# Patient Record
Sex: MALE | Race: WHITE
[De-identification: names, ages, dates, MRNs, and addresses within clinical notes are randomized per-mention and may not be internally consistent; named-entity substitution may affect disease eponyms.]

---

## 2018-05-25 ENCOUNTER — HOSPITAL ENCOUNTER (INPATIENT)
Dept: HOSPITAL 47 - EC | Age: 68
LOS: 3 days | Discharge: HOME | DRG: 247 | End: 2018-05-28
Payer: MEDICARE

## 2018-05-25 VITALS — BODY MASS INDEX: 37 KG/M2

## 2018-05-25 DIAGNOSIS — Z87.891: ICD-10-CM

## 2018-05-25 DIAGNOSIS — Z79.899: ICD-10-CM

## 2018-05-25 DIAGNOSIS — I47.2: ICD-10-CM

## 2018-05-25 DIAGNOSIS — I21.4: Primary | ICD-10-CM

## 2018-05-25 DIAGNOSIS — Z87.81: ICD-10-CM

## 2018-05-25 DIAGNOSIS — M19.91: ICD-10-CM

## 2018-05-25 DIAGNOSIS — Z94.7: ICD-10-CM

## 2018-05-25 DIAGNOSIS — Z98.1: ICD-10-CM

## 2018-05-25 DIAGNOSIS — Z87.442: ICD-10-CM

## 2018-05-25 DIAGNOSIS — E66.9: ICD-10-CM

## 2018-05-25 DIAGNOSIS — I10: ICD-10-CM

## 2018-05-25 DIAGNOSIS — E11.65: ICD-10-CM

## 2018-05-25 DIAGNOSIS — Z87.828: ICD-10-CM

## 2018-05-25 DIAGNOSIS — Z86.73: ICD-10-CM

## 2018-05-25 LAB
ALBUMIN SERPL-MCNC: 4 G/DL (ref 3.5–5)
ALP SERPL-CCNC: 123 U/L (ref 38–126)
ALT SERPL-CCNC: 47 U/L (ref 21–72)
ANION GAP SERPL CALC-SCNC: 15 MMOL/L
APTT BLD: 22.9 SEC (ref 22–30)
AST SERPL-CCNC: 28 U/L (ref 17–59)
BASOPHILS # BLD AUTO: 0.1 K/UL (ref 0–0.2)
BASOPHILS NFR BLD AUTO: 1 %
BUN SERPL-SCNC: 13 MG/DL (ref 9–20)
CALCIUM SPEC-MCNC: 9.6 MG/DL (ref 8.4–10.2)
CHLORIDE SERPL-SCNC: 103 MMOL/L (ref 98–107)
CK SERPL-CCNC: 161 U/L (ref 55–170)
CK SERPL-CCNC: 219 U/L (ref 55–170)
CO2 SERPL-SCNC: 22 MMOL/L (ref 22–30)
D DIMER PPP FEU-MCNC: 0.31 MG/L FEU (ref ?–0.6)
EOSINOPHIL # BLD AUTO: 0 K/UL (ref 0–0.7)
EOSINOPHIL NFR BLD AUTO: 0 %
ERYTHROCYTE [DISTWIDTH] IN BLOOD BY AUTOMATED COUNT: 5.49 M/UL (ref 4.3–5.9)
ERYTHROCYTE [DISTWIDTH] IN BLOOD: 13.9 % (ref 11.5–15.5)
GLUCOSE BLD-MCNC: 253 MG/DL (ref 75–99)
GLUCOSE SERPL-MCNC: 274 MG/DL (ref 74–99)
HCT VFR BLD AUTO: 50 % (ref 39–53)
HGB BLD-MCNC: 17 GM/DL (ref 13–17.5)
INR PPP: 1.1 (ref ?–1.2)
LYMPHOCYTES # SPEC AUTO: 1.1 K/UL (ref 1–4.8)
LYMPHOCYTES NFR SPEC AUTO: 13 %
MAGNESIUM SPEC-SCNC: 1.9 MG/DL (ref 1.6–2.3)
MCH RBC QN AUTO: 31 PG (ref 25–35)
MCHC RBC AUTO-ENTMCNC: 34 G/DL (ref 31–37)
MCV RBC AUTO: 91.1 FL (ref 80–100)
MONOCYTES # BLD AUTO: 0.4 K/UL (ref 0–1)
MONOCYTES NFR BLD AUTO: 5 %
NEUTROPHILS # BLD AUTO: 7.3 K/UL (ref 1.3–7.7)
NEUTROPHILS NFR BLD AUTO: 81 %
PLATELET # BLD AUTO: 162 K/UL (ref 150–450)
POTASSIUM SERPL-SCNC: 5.1 MMOL/L (ref 3.5–5.1)
PROT SERPL-MCNC: 6.7 G/DL (ref 6.3–8.2)
PT BLD: 10.7 SEC (ref 9–12)
SODIUM SERPL-SCNC: 140 MMOL/L (ref 137–145)
TROPONIN I SERPL-MCNC: 0.08 NG/ML (ref 0–0.03)
TROPONIN I SERPL-MCNC: 0.28 NG/ML (ref 0–0.03)
WBC # BLD AUTO: 9.1 K/UL (ref 3.8–10.6)

## 2018-05-25 PROCEDURE — 80053 COMPREHEN METABOLIC PANEL: CPT

## 2018-05-25 PROCEDURE — 85379 FIBRIN DEGRADATION QUANT: CPT

## 2018-05-25 PROCEDURE — 93306 TTE W/DOPPLER COMPLETE: CPT

## 2018-05-25 PROCEDURE — 84132 ASSAY OF SERUM POTASSIUM: CPT

## 2018-05-25 PROCEDURE — 85730 THROMBOPLASTIN TIME PARTIAL: CPT

## 2018-05-25 PROCEDURE — 85025 COMPLETE CBC W/AUTO DIFF WBC: CPT

## 2018-05-25 PROCEDURE — 96374 THER/PROPH/DIAG INJ IV PUSH: CPT

## 2018-05-25 PROCEDURE — 36415 COLL VENOUS BLD VENIPUNCTURE: CPT

## 2018-05-25 PROCEDURE — 71046 X-RAY EXAM CHEST 2 VIEWS: CPT

## 2018-05-25 PROCEDURE — 85610 PROTHROMBIN TIME: CPT

## 2018-05-25 PROCEDURE — 99291 CRITICAL CARE FIRST HOUR: CPT

## 2018-05-25 PROCEDURE — 82553 CREATINE MB FRACTION: CPT

## 2018-05-25 PROCEDURE — 83036 HEMOGLOBIN GLYCOSYLATED A1C: CPT

## 2018-05-25 PROCEDURE — 80048 BASIC METABOLIC PNL TOTAL CA: CPT

## 2018-05-25 PROCEDURE — 82550 ASSAY OF CK (CPK): CPT

## 2018-05-25 PROCEDURE — 93005 ELECTROCARDIOGRAM TRACING: CPT

## 2018-05-25 PROCEDURE — 84484 ASSAY OF TROPONIN QUANT: CPT

## 2018-05-25 PROCEDURE — 83735 ASSAY OF MAGNESIUM: CPT

## 2018-05-25 PROCEDURE — 80061 LIPID PANEL: CPT

## 2018-05-25 PROCEDURE — 93458 L HRT ARTERY/VENTRICLE ANGIO: CPT

## 2018-05-25 RX ADMIN — SODIUM CHLORIDE, PRESERVATIVE FREE SCH ML: 5 INJECTION INTRAVENOUS at 20:54

## 2018-05-25 RX ADMIN — METOPROLOL TARTRATE SCH MG: 25 TABLET, FILM COATED ORAL at 18:05

## 2018-05-25 RX ADMIN — NITROGLYCERIN SCH: 20 OINTMENT TOPICAL at 23:50

## 2018-05-25 RX ADMIN — INSULIN ASPART SCH UNIT: 100 INJECTION, SOLUTION INTRAVENOUS; SUBCUTANEOUS at 22:51

## 2018-05-25 RX ADMIN — NITROGLYCERIN SCH INCH: 20 OINTMENT TOPICAL at 18:05

## 2018-05-25 RX ADMIN — SODIUM CHLORIDE SCH MLS/HR: 450 INJECTION, SOLUTION INTRAVENOUS at 15:56

## 2018-05-25 RX ADMIN — ACETAMINOPHEN AND CODEINE PHOSPHATE PRN EACH: 300; 30 TABLET ORAL at 20:52

## 2018-05-25 RX ADMIN — METOPROLOL TARTRATE SCH: 25 TABLET, FILM COATED ORAL at 22:52

## 2018-05-25 NOTE — XR
EXAMINATION TYPE: XR chest 2V

 

DATE OF EXAM: 5/25/2018

 

COMPARISON: Prior chest CT 6/12/2012

 

HISTORY: Chest pain

 

TECHNIQUE:  Frontal and lateral views of the chest are obtained.

 

FINDINGS:  There is no focal air space opacity, pleural effusion, or pneumothorax seen.  The cardiac 
silhouette size is within normal limits.   The osseous structures are intact. Postop changes are note
d to the cervical spine. Patient is post median sternotomy. There are prominent lung volumes possibly
 indicative of COPD.

 

IMPRESSION:  No acute cardiopulmonary process.

## 2018-05-25 NOTE — ED
General Adult HPI





- General


Chief complaint: Chest Pain


Stated complaint: Chest Pain


Time Seen by Provider: 05/25/18 14:06


Source: patient, EMS, RN notes reviewed


Mode of arrival: EMS


Limitations: no limitations





- History of Present Illness


Initial comments: 





Patient is a pleasant 67-year-old male presenting to the emergency Department 

with complaints of chest discomfort.  Onset was as he was finishing mowing the 

lawn.  Discomfort is somewhat improved with nitroglycerin.  Discomfort is 

currently 4/10.  No associated dyspnea or nausea.  Patient was sweaty however 

feels he may been sweaty just from cutting the grass.  No history of similar 

symptoms previously.  There was some radiation towards the left shoulder and 

right jaw.  Patient has a difficult time describing the type of discomfort he 

experienced.





- Related Data


 Home Medications











 Medication  Instructions  Recorded  Confirmed


 


Cinnamon Bark [Cinnamon] 500 mg PO DAILY 05/25/18 05/25/18


 


Fenugreek 1 tab PO DAILY 05/25/18 05/25/18


 


Garlic 1 tab PO DAILY 05/25/18 05/25/18


 


Glucosam/Gamal-Msm1/C/Carson/Bosw 1 tab PO DAILY 05/25/18 05/25/18





[Glucosamine-Chondroitin Tablet]   


 


Magnesium(Unknown Dose) 1 tab PO DAILY 05/25/18 05/25/18


 


Prostate Strong 1 tab PO DAILY 05/25/18 05/25/18











 Allergies











Allergy/AdvReac Type Severity Reaction Status Date / Time


 


No Known Allergies Allergy   Verified 05/25/18 14:24














Review of Systems


ROS Statement: 


Those systems with pertinent positive or pertinent negative responses have been 

documented in the HPI.





ROS Other: All systems not noted in ROS Statement are negative.


Constitutional: Denies: fever


Eyes: Denies: eye pain


ENT: Denies: ear pain


Respiratory: Denies: cough, dyspnea


Cardiovascular: Reports: chest pain


Endocrine: Denies: fatigue


Gastrointestinal: Denies: nausea


Genitourinary: Denies: dysuria


Musculoskeletal: Denies: back pain


Skin: Denies: rash


Neurological: Denies: weakness





Past Medical History


Past Medical History: No Reported History


History of Any Multi-Drug Resistant Organisms: None Reported


Past Surgical History: Orthopedic Surgery


Past Psychological History: No Psychological Hx Reported


Smoking Status: Former smoker


Past Alcohol Use History: Occasional


Past Drug Use History: None Reported





General Exam


Limitations: no limitations


General appearance: alert, in no apparent distress


Head exam: Present: atraumatic


Eye exam: Present: normal appearance, PERRL


ENT exam: Present: normal oropharynx


Neck exam: Present: normal inspection


Respiratory exam: Present: normal lung sounds bilaterally.  Absent: chest wall 

tenderness


Cardiovascular Exam: Present: regular rate, normal rhythm, normal heart sounds


  ** Expanded


Peripheral pulses: 2+: Radial (R), Radial (L), Posterior Tibialis (R), 

Posterior Tibialis (L)


GI/Abdominal exam: Present: soft.  Absent: tenderness


Extremities exam: Present: normal inspection.  Absent: pedal edema, calf 

tenderness


Neurological exam: Present: alert


Psychiatric exam: Present: normal affect, normal mood


Skin exam: Present: normal color





Course


 Vital Signs











  05/25/18 05/25/18





  13:49 15:25


 


Temperature 97.8 F 


 


Pulse Rate 84 76


 


Respiratory 18 16





Rate  


 


Blood Pressure 169/97 169/89


 


O2 Sat by Pulse 96 99





Oximetry  














EKG Findings





- EKG Comments:


EKG Findings:: Normal sinus rhythm 79.  .  .  .  .  

Normal axis.  Normal QRS.  No acute ST change.





Medical Decision Making





- Medical Decision Making





Patient reevaluated and resting comfortably in bed.  No significant discomfort 

at this time.  Patient and family are updated on results and plan.  Concern 

exists with elevated troponin.  Patient will be started on heparin.  Case was 

discussed with Dr. deluna, who will admit for Dr. Castrejon.  Cardiology will be 

placed on consult.  Patient is advised to inform nursing if his symptoms worsen.





- Lab Data


Result diagrams: 


 05/25/18 13:54





 05/25/18 13:54


 Lab Results











  05/25/18 05/25/18 05/25/18 Range/Units





  13:54 13:54 13:54 


 


WBC   9.1   (3.8-10.6)  k/uL


 


RBC   5.49   (4.30-5.90)  m/uL


 


Hgb   17.0   (13.0-17.5)  gm/dL


 


Hct   50.0   (39.0-53.0)  %


 


MCV   91.1   (80.0-100.0)  fL


 


MCH   31.0   (25.0-35.0)  pg


 


MCHC   34.0   (31.0-37.0)  g/dL


 


RDW   13.9   (11.5-15.5)  %


 


Plt Count   162   (150-450)  k/uL


 


Neutrophils %   81   %


 


Lymphocytes %   13   %


 


Monocytes %   5   %


 


Eosinophils %   0   %


 


Basophils %   1   %


 


Neutrophils #   7.3   (1.3-7.7)  k/uL


 


Lymphocytes #   1.1   (1.0-4.8)  k/uL


 


Monocytes #   0.4   (0-1.0)  k/uL


 


Eosinophils #   0.0   (0-0.7)  k/uL


 


Basophils #   0.1   (0-0.2)  k/uL


 


PT     (9.0-12.0)  sec


 


INR     (<1.2)  


 


APTT     (22.0-30.0)  sec


 


D-Dimer     (<0.60)  mg/L FEU


 


Sodium    140  (137-145)  mmol/L


 


Potassium    5.1  (3.5-5.1)  mmol/L


 


Chloride    103  ()  mmol/L


 


Carbon Dioxide    22  (22-30)  mmol/L


 


Anion Gap    15  mmol/L


 


BUN    13  (9-20)  mg/dL


 


Creatinine    0.84  (0.66-1.25)  mg/dL


 


Est GFR (CKD-EPI)AfAm    >90  (>60 ml/min/1.73 sqM)  


 


Est GFR (CKD-EPI)NonAf    >90  (>60 ml/min/1.73 sqM)  


 


Glucose    274 H  (74-99)  mg/dL


 


Calcium    9.6  (8.4-10.2)  mg/dL


 


Magnesium    1.9  (1.6-2.3)  mg/dL


 


Total Bilirubin    2.1 H  (0.2-1.3)  mg/dL


 


AST    28  (17-59)  U/L


 


ALT    47  (21-72)  U/L


 


Alkaline Phosphatase    123  ()  U/L


 


Total Creatine Kinase  161    ()  U/L


 


CK-MB (CK-2)  2.5 H*    (0.0-2.4)  ng/mL


 


CK-MB (CK-2) Rel Index  1.6    


 


Troponin I  0.082 H*    (0.000-0.034)  ng/mL


 


Total Protein    6.7  (6.3-8.2)  g/dL


 


Albumin    4.0  (3.5-5.0)  g/dL














  05/25/18 Range/Units





  13:54 


 


WBC   (3.8-10.6)  k/uL


 


RBC   (4.30-5.90)  m/uL


 


Hgb   (13.0-17.5)  gm/dL


 


Hct   (39.0-53.0)  %


 


MCV   (80.0-100.0)  fL


 


MCH   (25.0-35.0)  pg


 


MCHC   (31.0-37.0)  g/dL


 


RDW   (11.5-15.5)  %


 


Plt Count   (150-450)  k/uL


 


Neutrophils %   %


 


Lymphocytes %   %


 


Monocytes %   %


 


Eosinophils %   %


 


Basophils %   %


 


Neutrophils #   (1.3-7.7)  k/uL


 


Lymphocytes #   (1.0-4.8)  k/uL


 


Monocytes #   (0-1.0)  k/uL


 


Eosinophils #   (0-0.7)  k/uL


 


Basophils #   (0-0.2)  k/uL


 


PT  10.7  (9.0-12.0)  sec


 


INR  1.1  (<1.2)  


 


APTT  22.9  (22.0-30.0)  sec


 


D-Dimer  0.31  (<0.60)  mg/L FEU


 


Sodium   (137-145)  mmol/L


 


Potassium   (3.5-5.1)  mmol/L


 


Chloride   ()  mmol/L


 


Carbon Dioxide   (22-30)  mmol/L


 


Anion Gap   mmol/L


 


BUN   (9-20)  mg/dL


 


Creatinine   (0.66-1.25)  mg/dL


 


Est GFR (CKD-EPI)AfAm   (>60 ml/min/1.73 sqM)  


 


Est GFR (CKD-EPI)NonAf   (>60 ml/min/1.73 sqM)  


 


Glucose   (74-99)  mg/dL


 


Calcium   (8.4-10.2)  mg/dL


 


Magnesium   (1.6-2.3)  mg/dL


 


Total Bilirubin   (0.2-1.3)  mg/dL


 


AST   (17-59)  U/L


 


ALT   (21-72)  U/L


 


Alkaline Phosphatase   ()  U/L


 


Total Creatine Kinase   ()  U/L


 


CK-MB (CK-2)   (0.0-2.4)  ng/mL


 


CK-MB (CK-2) Rel Index   


 


Troponin I   (0.000-0.034)  ng/mL


 


Total Protein   (6.3-8.2)  g/dL


 


Albumin   (3.5-5.0)  g/dL














- Radiology Data


Radiology results: image reviewed (Chest x-ray shows no acute process.)





Critical Care Time


Critical Care Time: Yes


Total Critical Care Time: 33





Disposition


Clinical Impression: 


 Acute coronary syndrome





Disposition: ADMITTED AS IP TO THIS [facility]


Condition: Serious


Is patient prescribed a controlled substance at d/c from ED?: No


Referrals: 


Yanna Castrejon MD [Primary Care Provider] - 1-2 days


Decision Time: 15:34
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
## 2018-05-26 LAB
ANION GAP SERPL CALC-SCNC: 11 MMOL/L
BASOPHILS # BLD AUTO: 0 K/UL (ref 0–0.2)
BASOPHILS NFR BLD AUTO: 1 %
BUN SERPL-SCNC: 12 MG/DL (ref 9–20)
CALCIUM SPEC-MCNC: 8.3 MG/DL (ref 8.4–10.2)
CHLORIDE SERPL-SCNC: 103 MMOL/L (ref 98–107)
CHOLEST SERPL-MCNC: 165 MG/DL (ref ?–200)
CK SERPL-CCNC: 558 U/L (ref 55–170)
CO2 SERPL-SCNC: 23 MMOL/L (ref 22–30)
EOSINOPHIL # BLD AUTO: 0.1 K/UL (ref 0–0.7)
EOSINOPHIL NFR BLD AUTO: 2 %
ERYTHROCYTE [DISTWIDTH] IN BLOOD BY AUTOMATED COUNT: 5.14 M/UL (ref 4.3–5.9)
ERYTHROCYTE [DISTWIDTH] IN BLOOD: 13.5 % (ref 11.5–15.5)
GLUCOSE BLD-MCNC: 168 MG/DL (ref 75–99)
GLUCOSE BLD-MCNC: 188 MG/DL (ref 75–99)
GLUCOSE BLD-MCNC: 218 MG/DL (ref 75–99)
GLUCOSE SERPL-MCNC: 229 MG/DL (ref 74–99)
HBA1C MFR BLD: 11.4 % (ref 4–6)
HCT VFR BLD AUTO: 46.6 % (ref 39–53)
HDLC SERPL-MCNC: 44 MG/DL (ref 40–60)
HGB BLD-MCNC: 16.2 GM/DL (ref 13–17.5)
LDLC SERPL CALC-MCNC: 96 MG/DL (ref 0–99)
LYMPHOCYTES # SPEC AUTO: 1.7 K/UL (ref 1–4.8)
LYMPHOCYTES NFR SPEC AUTO: 32 %
MAGNESIUM SPEC-SCNC: 1.8 MG/DL (ref 1.6–2.3)
MCH RBC QN AUTO: 31.6 PG (ref 25–35)
MCHC RBC AUTO-ENTMCNC: 34.9 G/DL (ref 31–37)
MCV RBC AUTO: 90.6 FL (ref 80–100)
MONOCYTES # BLD AUTO: 0.3 K/UL (ref 0–1)
MONOCYTES NFR BLD AUTO: 6 %
NEUTROPHILS # BLD AUTO: 3 K/UL (ref 1.3–7.7)
NEUTROPHILS NFR BLD AUTO: 57 %
PLATELET # BLD AUTO: 172 K/UL (ref 150–450)
POTASSIUM SERPL-SCNC: 3.7 MMOL/L (ref 3.5–5.1)
POTASSIUM SERPL-SCNC: 4.2 MMOL/L (ref 3.5–5.1)
SODIUM SERPL-SCNC: 137 MMOL/L (ref 137–145)
TRIGL SERPL-MCNC: 123 MG/DL (ref ?–150)
TROPONIN I SERPL-MCNC: 11 NG/ML (ref 0–0.03)
WBC # BLD AUTO: 5.3 K/UL (ref 3.8–10.6)

## 2018-05-26 PROCEDURE — 4A023N7 MEASUREMENT OF CARDIAC SAMPLING AND PRESSURE, LEFT HEART, PERCUTANEOUS APPROACH: ICD-10-PCS

## 2018-05-26 PROCEDURE — B211YZZ FLUOROSCOPY OF MULTIPLE CORONARY ARTERIES USING OTHER CONTRAST: ICD-10-PCS

## 2018-05-26 PROCEDURE — 027034Z DILATION OF CORONARY ARTERY, ONE ARTERY WITH DRUG-ELUTING INTRALUMINAL DEVICE, PERCUTANEOUS APPROACH: ICD-10-PCS

## 2018-05-26 RX ADMIN — INSULIN ASPART SCH UNIT: 100 INJECTION, SOLUTION INTRAVENOUS; SUBCUTANEOUS at 21:01

## 2018-05-26 RX ADMIN — NITROGLYCERIN SCH: 20 OINTMENT TOPICAL at 06:12

## 2018-05-26 RX ADMIN — METOPROLOL TARTRATE SCH MG: 25 TABLET, FILM COATED ORAL at 09:19

## 2018-05-26 RX ADMIN — ATORVASTATIN CALCIUM SCH MG: 80 TABLET, FILM COATED ORAL at 20:01

## 2018-05-26 RX ADMIN — MORPHINE SULFATE ONE MG: 4 INJECTION, SOLUTION INTRAMUSCULAR; INTRAVENOUS at 13:14

## 2018-05-26 RX ADMIN — INSULIN ASPART SCH UNIT: 100 INJECTION, SOLUTION INTRAVENOUS; SUBCUTANEOUS at 17:29

## 2018-05-26 RX ADMIN — PANTOPRAZOLE SODIUM SCH: 40 TABLET, DELAYED RELEASE ORAL at 06:31

## 2018-05-26 RX ADMIN — SODIUM CHLORIDE, PRESERVATIVE FREE SCH ML: 5 INJECTION INTRAVENOUS at 20:01

## 2018-05-26 RX ADMIN — LIDOCAINE HYDROCHLORIDE ONE ML: 20 INJECTION, SOLUTION INFILTRATION; PERINEURAL at 13:14

## 2018-05-26 RX ADMIN — CEFAZOLIN SCH MLS/HR: 330 INJECTION, POWDER, FOR SOLUTION INTRAMUSCULAR; INTRAVENOUS at 14:55

## 2018-05-26 RX ADMIN — INSULIN ASPART SCH: 100 INJECTION, SOLUTION INTRAVENOUS; SUBCUTANEOUS at 06:30

## 2018-05-26 RX ADMIN — INSULIN ASPART SCH: 100 INJECTION, SOLUTION INTRAVENOUS; SUBCUTANEOUS at 14:53

## 2018-05-26 RX ADMIN — SODIUM CHLORIDE SCH MLS/HR: 450 INJECTION, SOLUTION INTRAVENOUS at 08:18

## 2018-05-26 RX ADMIN — MORPHINE SULFATE ONE MG: 4 INJECTION, SOLUTION INTRAMUSCULAR; INTRAVENOUS at 12:23

## 2018-05-26 RX ADMIN — METOPROLOL TARTRATE SCH MG: 25 TABLET, FILM COATED ORAL at 20:01

## 2018-05-26 RX ADMIN — SODIUM CHLORIDE, PRESERVATIVE FREE SCH: 5 INJECTION INTRAVENOUS at 09:18

## 2018-05-26 RX ADMIN — LIDOCAINE HYDROCHLORIDE ONE ML: 20 INJECTION, SOLUTION INFILTRATION; PERINEURAL at 12:27
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
## 2018-05-26 NOTE — LTR
DATE OF SERVICE:

05/26/18



Dear Dr. Castrejon:



Thank you for the opportunity to participate in the care of Mr. Caballero.  I am pleased

to report to you that he had an excellent angiographic result.  I dilated the LAD with

a drug-eluting stent and I expect to do the PDA branch of circumflex prior to his

discharge.  The circumflex is somewhat of a distal lesion and technically more

challenging and I will do this either on Monday or Tuesday.



Thank you for your referral.  Please call for questions.



With kindest regards,



Sincerely yours,





MMRUMAL / IJN: [id number] / Job#: [id number]
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
## 2018-05-26 NOTE — CC
CARDIAC CATHETERIZATION REPORT



DATE OF SERVICE:

05/26/2018.



PROCEDURE:

1. Left heart catheterization and coronary angiography.

2. PTCA and stenting of proximal/mid LAD with a drug-eluting stent.



PERFORMED BY:

Dr. ERIC Branch.



SEDATION:

Moderate conscious sedation time was 61 minutes.  The patient was given morphine
,

Versed and Benadryl and his oxygen saturation, hemodynamics and EKG were 
monitored

closely.



CLINICAL INFORMATION:

Mr. Daniel Caballero is a 67-year-old gentleman with a remote history of TIA, 
from which

he recovered fully and this was more than 10-12 years ago.  He does not take any

specific prescription medications and he came into the hospital with chest pain

syndrome suggestive of unstable angina and this occurred while he was cutting 
grass.

He also developed some diaphoresis.  His initial troponin was mildly elevated.

Subsequently he was totally pain-free.  EKG did not reveal any clear-cut 
changes.

However, his subsequent troponin was elevated.  Echocardiogram revealed minimal

inferobasal hypokinesia.  Ejection fraction was normal.  He has had a cardiac 
cath

apparently more than 10 years ago and was told it was normal.  The details are

unavailable.  In view of his non-ST elevation MI, he was advised coronary 
angiography.

I saw the patient this morning, explained to him the rationale, risks, benefits,

options and also spoke to his wife by phone.  The clinical picture was strongly

suggestive of acute ischemic syndrome with a non-ST elevation MI.



PROCEDURE NOTE:

Under local anesthesia and strict aseptic precautions, a 6-Serbian introducer 
was placed

in the right femoral artery.  I used a standard Homero catheters to perform 
coronary

angiography and noted that there was a 99% stenosis involving the proximal/mid 
LAD

immediately after the diagonal and small septal branch.  He also had a subtotal

occlusion of a PDA branch of circumflex which could also be a culprit lesion.  
RCA was

a non dominant disease-free vessel.  Circumflex was dominant.  He was advised

intervention that was performed expeditiously and I performed intervention of 
the LAD,

which was a tighter lesion and recommended that before he leaves the hospital 
to have

staged intervention of the PDA branch of circumflex.



PTCA PROCEDURE DETAILS:

I initially tried an XB LAD catheter then switched over to a C curve 4.00 
catheter to

cannulate the left coronary artery.  I used a run-through wire to cross the 
lesion in

the LAD.  Predilatation was performed using a 15 mm long Euphora balloon.  I 
then

deployed a 23 mm long 3.0 caliber Xience stent at 11-12 atmospheres.  Patient 
did not

have chest pain but had significant anterior ST changes and ST segment 
elevation.  He

had excellent angiographic result without complication.  He received 600 mg of 
Plavix.

He also received Angiomax bolus and infusion as per protocol.  The sheath was 
taken out

and a Perclose device used to secure hemostasis.  He was sent to the room in a 
stable

condition.



CARDIAC CATHETERIZATION FINDINGS:

The left ventricle end-diastolic pressure was about 15 mmHg.  I used a pigtail 
catheter

to check pressures but did not perform LV gram.



CORONARY ANGIOGRAPHY FINDINGS:

RIGHT CORONARY ARTERY : Technically this is a nondominant vessel, has no 
significant

disease and distally bifurcates into 2 small branches.  There is an acute 
marginal

which is quite tortuous and supplies right ventricle.  There is no significant 
disease

in the nondominant RCA.  At the junction of the proximal and middle 1/3 there 
is minor

30% narrowing noted.

LEFT MAIN CORONARY ARTERY:  This is a short patent disease-free vessel that 
immediately

bifurcates into LAD and circumflex.  There is no significant disease in the 
short left

main coronary artery.

LEFT ANTERIOR DESCENDING CORONARY ARTERY:  This is a good caliber LAD that 
gives off a

large diagonal branch proximally.  A small septal branch there is an eccentric 
99%

stenosis after which the caliber improves and vessel runs all the way to the 
apex.  The

distal 1/4 of the LAD also has some diffuse disease in it and it curves over 
the apex

to supply the inferoapical portion of left ventricle.  The LAD therefore at the

junction of proximal and middle one third has a 99% stenosis calcified and quite

severe.  The first diagonal is free of significant disease.  The distal LAD has

significant disease.

LEFT POSTERIOR CIRCUMFLEX CORONARY ARTERY: This is a very dominant vessel, 
gives off a

small obtuse marginal proximally, then a moderate-sized obtuse marginal soon 
after that

and then there is a large obtuse marginal that bifurcates into 2 branches.  All 
the

obtuse marginals have minor irregularities with of about 30%.  The large obtuse

marginal bifurcates into 2 branches.  The inferior branch has about a 40% 
narrowing.

Then circumflex then continues distally and it bifurcates into 2 branches, 1 is 
a

smaller PLV branch and a larger PDA branch.  The larger PDA has an eccentric 90%

stenosis and there is some tortuosity after the stenotic segment and it appears 
to be a

fair caliber vessel.  This also could be a culprit lesion.  The circumflex 
therefore is

dominant, has several obtuse marginal, but the PDA branches which was of good 
caliber

and distribution has a 99 to 95% stenosis with some haziness.



LEFT VENTRICULOGRAM:

This was not performed.



FINAL IMPRESSION:

This patient has normal filling pressures and 99% proximal/mid LAD lesion and 
distal

diffuse LAD lesion.  There is a 90% PDA branch of Dominant circumflex lesion. 
Non Dom RCA with no significant disease



RECOMMENDATION:

I recommended intervention of the LAD and proceeded to perform this in the same

setting.



PCI RESULTS:

Excellent angiographic result was achieved with a 23 mm long drug-eluting 
Xience stent.

Results were discussed with the patient and also I spoke to the wife by phone.





FAVIOLA / DERECK: [id number] / Job#: [id number]

MTDD

## 2018-05-26 NOTE — ECHOF
Referral Reason:acs



MEASUREMENTS

--------

HEIGHT: 172.7 cm

WEIGHT: 113.4 kg

BP: 168/93

RVIDd:   3.3 cm     (< 3.3)

IVSd:   1.2 cm     (0.6 - 1.1)

LVIDd:   4.0 cm     (3.9 - 5.3)

LVPWd:   1.2 cm     (0.6 - 1.1)

IVSs:   1.7 cm

LVIDs:   3.0 cm

LVPWs:   1.7 cm

LAESV Index (A-L):   29.76 ml/m

Ao Diam:   3.8 cm     (2.0 - 3.7)

AV Cusp:   2.2 cm     (1.5 - 2.6)

LA Diam:   3.3 cm     (2.7 - 3.8)

EPSS:   0.6 cm

MV E Raul:   0.38 m/s

MV DecT:   349 ms

MV A Raul:   0.65 m/s

MV E/A Ratio:   0.59 

RAP:   5.00 mmHg

RVSP:   9.51 mmHg

MV EF SLOPE:   31.19 mm/s     (70 - 150)

MV EXCURSION:   1.50 cm     (> 18.000)







FINDINGS

--------

Sinus rhythm.

This was a technically adequate study.

The left ventricular size is normal.   There is mild concentric left ventricular hypertrophy.   Overa
ll left ventricular systolic function is low-normal with, an EF between 50 - 55 %.

The right ventricle is mildly enlarged.

LA is midly dilated 29-33ml/m2.

The right atrium was not well visualized.

Aortic valve is trileaflet and is mildly thickened.   There is no evidence of aortic regurgitation.  
 There is no evidence of aortic stenosis.

The mitral valve leaflets are mildly thickened.   There is trace to mild mitral regurgitation.

Trace tricuspid regurgitation present.   Right ventricular systolic pressure is normal at < 35 mmHg. 
  There is no evidence of pulmonary hypertension.

Trace/mild (physiologic)  pulmonic regurgitation.

The aortic root size is normal.

Normal inferior vena cava with normal inspiratory collapse consistent with estimated right atrial pre
ssure of  5 mmHg.

There is no pericardial effusion.



CONCLUSIONS

--------

1. Sinus rhythm.

2. This was a technically adequate study.

3. The left ventricular size is normal.

4. There is mild concentric left ventricular hypertrophy.

5. Overall left ventricular systolic function is low-normal with, an EF between 50 - 55 %.

6. The right ventricle is mildly enlarged.

7. LA is midly dilated 29-33ml/m2.

8. The right atrium was not well visualized.

9. Aortic valve is trileaflet and is mildly thickened.

10. The mitral valve leaflets are mildly thickened.

11. There is trace to mild mitral regurgitation.

12. Trace tricuspid regurgitation present.

13. Right ventricular systolic pressure is normal at < 35 mmHg.

14. There is no evidence of pulmonary hypertension.

15. Trace/mild (physiologic)  pulmonic regurgitation.

16. The aortic root size is normal.

17. There is no pericardial effusion.





SONOGRAPHER: Jose M Fitch RDCS

## 2018-05-27 LAB
ANION GAP SERPL CALC-SCNC: 10 MMOL/L
BASOPHILS # BLD AUTO: 0 K/UL (ref 0–0.2)
BASOPHILS NFR BLD AUTO: 0 %
BUN SERPL-SCNC: 10 MG/DL (ref 9–20)
CALCIUM SPEC-MCNC: 8.5 MG/DL (ref 8.4–10.2)
CHLORIDE SERPL-SCNC: 104 MMOL/L (ref 98–107)
CO2 SERPL-SCNC: 23 MMOL/L (ref 22–30)
EOSINOPHIL # BLD AUTO: 0.1 K/UL (ref 0–0.7)
EOSINOPHIL NFR BLD AUTO: 1 %
ERYTHROCYTE [DISTWIDTH] IN BLOOD BY AUTOMATED COUNT: 5.12 M/UL (ref 4.3–5.9)
ERYTHROCYTE [DISTWIDTH] IN BLOOD: 13.7 % (ref 11.5–15.5)
GLUCOSE BLD-MCNC: 161 MG/DL (ref 75–99)
GLUCOSE BLD-MCNC: 177 MG/DL (ref 75–99)
GLUCOSE BLD-MCNC: 186 MG/DL (ref 75–99)
GLUCOSE BLD-MCNC: 219 MG/DL (ref 75–99)
GLUCOSE SERPL-MCNC: 181 MG/DL (ref 74–99)
HCT VFR BLD AUTO: 47.2 % (ref 39–53)
HGB BLD-MCNC: 16.1 GM/DL (ref 13–17.5)
LYMPHOCYTES # SPEC AUTO: 1.1 K/UL (ref 1–4.8)
LYMPHOCYTES NFR SPEC AUTO: 14 %
MCH RBC QN AUTO: 31.4 PG (ref 25–35)
MCHC RBC AUTO-ENTMCNC: 34.1 G/DL (ref 31–37)
MCV RBC AUTO: 92.2 FL (ref 80–100)
MONOCYTES # BLD AUTO: 0.5 K/UL (ref 0–1)
MONOCYTES NFR BLD AUTO: 6 %
NEUTROPHILS # BLD AUTO: 6.1 K/UL (ref 1.3–7.7)
NEUTROPHILS NFR BLD AUTO: 77 %
PLATELET # BLD AUTO: 165 K/UL (ref 150–450)
POTASSIUM SERPL-SCNC: 4.2 MMOL/L (ref 3.5–5.1)
SODIUM SERPL-SCNC: 137 MMOL/L (ref 137–145)
WBC # BLD AUTO: 7.9 K/UL (ref 3.8–10.6)

## 2018-05-27 PROCEDURE — 027035Z DILATION OF CORONARY ARTERY, ONE ARTERY WITH TWO DRUG-ELUTING INTRALUMINAL DEVICES, PERCUTANEOUS APPROACH: ICD-10-PCS

## 2018-05-27 RX ADMIN — CEFAZOLIN SCH MLS/HR: 330 INJECTION, POWDER, FOR SOLUTION INTRAMUSCULAR; INTRAVENOUS at 03:24

## 2018-05-27 RX ADMIN — ATORVASTATIN CALCIUM SCH: 80 TABLET, FILM COATED ORAL at 12:02

## 2018-05-27 RX ADMIN — ACETAMINOPHEN AND CODEINE PHOSPHATE PRN EACH: 300; 30 TABLET ORAL at 20:16

## 2018-05-27 RX ADMIN — INSULIN ASPART SCH UNIT: 100 INJECTION, SOLUTION INTRAVENOUS; SUBCUTANEOUS at 17:27

## 2018-05-27 RX ADMIN — ASPIRIN 81 MG CHEWABLE TABLET SCH MG: 81 TABLET CHEWABLE at 09:22

## 2018-05-27 RX ADMIN — METOPROLOL TARTRATE SCH MG: 25 TABLET, FILM COATED ORAL at 20:17

## 2018-05-27 RX ADMIN — ATORVASTATIN CALCIUM SCH MG: 80 TABLET, FILM COATED ORAL at 20:16

## 2018-05-27 RX ADMIN — INSULIN ASPART SCH UNIT: 100 INJECTION, SOLUTION INTRAVENOUS; SUBCUTANEOUS at 06:41

## 2018-05-27 RX ADMIN — SODIUM CHLORIDE, PRESERVATIVE FREE SCH ML: 5 INJECTION INTRAVENOUS at 09:22

## 2018-05-27 RX ADMIN — INSULIN ASPART SCH: 100 INJECTION, SOLUTION INTRAVENOUS; SUBCUTANEOUS at 13:31

## 2018-05-27 RX ADMIN — SODIUM CHLORIDE, PRESERVATIVE FREE SCH ML: 5 INJECTION INTRAVENOUS at 20:17

## 2018-05-27 RX ADMIN — INSULIN ASPART SCH UNIT: 100 INJECTION, SOLUTION INTRAVENOUS; SUBCUTANEOUS at 21:52

## 2018-05-27 RX ADMIN — CLOPIDOGREL BISULFATE SCH MG: 75 TABLET ORAL at 09:22

## 2018-05-27 RX ADMIN — CEFAZOLIN SCH MLS/HR: 330 INJECTION, POWDER, FOR SOLUTION INTRAMUSCULAR; INTRAVENOUS at 17:28

## 2018-05-27 RX ADMIN — LISINOPRIL SCH MG: 5 TABLET ORAL at 09:22

## 2018-05-27 RX ADMIN — PANTOPRAZOLE SODIUM SCH MG: 40 TABLET, DELAYED RELEASE ORAL at 06:41

## 2018-05-27 RX ADMIN — METOPROLOL TARTRATE SCH MG: 25 TABLET, FILM COATED ORAL at 09:22

## 2018-05-27 NOTE — CC
CARDIAC CATHETERIZATION REPORT



DATE OF SERVICE:

05/27/2018



PROCEDURE PERFORMED:

PTCA and stenting off PDA branch of dominant circumflex coronary artery with a drug-

eluting stent.



PERFORMED BY:

Dr. ERIC Branch.



ANESTHESIA:

Moderate conscious sedation time was 39 minutes.



CLINICAL INFORMATION:

Mr. Caballero is a 67-year-old gentleman admitted to the hospital with a non-ST elevation

MI and underwent stenting of a very tight proximal/mid LAD yesterday with a drug-

eluting stent and was brought in for a staged intervention today.  Risks, benefits,

options and rationale were carefully explained to the patient and wife.



PROCEDURE NOTE:

Under local anesthesia and strict aseptic precautions, a 6-Indonesian introducer was placed

in the right radial artery.  I used a Q4 left side guide catheter and cannulated the

left coronary artery.  I took injections to check patency of LAD and LAD was widely

patent without any compromise in flow.  I then transferred my attention to the

circumflex system.  Using the same guide catheter with a run-through wire, I crossed

the lesion in the PDA branch which was a very large and very distal lesion and a very

large distribution vessel.  Without predilatation I advanced a 12 mm long 2.75 caliber

Xience stent and deployed this in the distal portion of the lesion.  There was still

another area of narrowing proximally just before the stented segment and this was

addressed with another 8 mm 2.75 caliber Xience stent.  With this I was able to deploy

and telescope into the previously placed stent.  Excellent angiographic result was

achieved with a remarkably good angiographic appearance and flow.  There were no

complications.  Results were discussed with the patient and also with his wife by

phone.  This appears to be a very large vessel with significant amount of distribution

of the myocardium and it runs all the way to the apex and is the PDA branch that also

gives off some septal branches.  The patient received Angiomax bolus and infusion as

per protocol.  He also received 150 mg of Plavix additionally.



Excellent angiographic result without complication was achieved and the sheath was

taken out and TR band applied as per protocol and he was sent to the room in a stable

condition.



This patient had stenting of proximal/mid LAD.  This vessel was widely patent.  I

performed a stenting of his PDA branch of circumflex with 2 drug-eluting stents.

Excellent result was achieved.  I expect the patient to be discharged in the next 24

hours.





MMODL / IJN: [id number] / Job#: [id number]

## 2018-05-27 NOTE — LTR
DATE OF SERVICE:

05/27/18



Dear Dr. Castrejon:



Thank you for the opportunity to participate in the care of Mr. Daniel Caballero.  As you

may have received my earlier communication,  I performed stenting of his LAD yesterday

with a good result and I brought him back today to check the patency of LAD.  The LAD

was widely patent with good flow.  I then performed stenting of the PDA branch of

circumflex with a good result.  I expect he will be discharged tomorrow if he remains

stable.



Thank you for your referral and please do call for questions.



With kindest regards,



Sincerely,





FAVIOLA / EZION: [id number] / Job#: [id number]

## 2018-05-27 NOTE — PN
PROGRESS NOTE



Mrs. Caballero underwent stenting of a proximal/mid LAD yesterday from right femoral

approach.  His right groin is clean and dry.



Vital signs are stable.  Labs are good.  EKG is good.  He also has a significant lesion

in the PDA branch of circumflex which I am recommending that we will perform today,

probably from the right radial approach.  I explained to the patient the rationale,

risks, benefits, options.  This may have actually been the culprit vessel.  Procedure

will be performed later on today.



Patient understands risks, benefits and options and wishes to proceed.



Vital signs are stable.  S1-S2 heard normally.  Lungs are clear.  Abdomen and lower

extremity exam unchanged.  Right groin is clean and dry with small area of ecchymosis.

Pulses good.





MMODL / IJN: [id number] / Job#: [id number]
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
## 2018-05-28 VITALS — TEMPERATURE: 97.7 F | SYSTOLIC BLOOD PRESSURE: 146 MMHG | DIASTOLIC BLOOD PRESSURE: 83 MMHG | HEART RATE: 59 BPM

## 2018-05-28 VITALS — RESPIRATION RATE: 18 BRPM

## 2018-05-28 LAB
ANION GAP SERPL CALC-SCNC: 12 MMOL/L
BASOPHILS # BLD AUTO: 0 K/UL (ref 0–0.2)
BASOPHILS NFR BLD AUTO: 1 %
BUN SERPL-SCNC: 11 MG/DL (ref 9–20)
CALCIUM SPEC-MCNC: 8.5 MG/DL (ref 8.4–10.2)
CHLORIDE SERPL-SCNC: 107 MMOL/L (ref 98–107)
CO2 SERPL-SCNC: 20 MMOL/L (ref 22–30)
EOSINOPHIL # BLD AUTO: 0.1 K/UL (ref 0–0.7)
EOSINOPHIL NFR BLD AUTO: 2 %
ERYTHROCYTE [DISTWIDTH] IN BLOOD BY AUTOMATED COUNT: 4.91 M/UL (ref 4.3–5.9)
ERYTHROCYTE [DISTWIDTH] IN BLOOD: 14.5 % (ref 11.5–15.5)
GLUCOSE BLD-MCNC: 134 MG/DL (ref 75–99)
GLUCOSE BLD-MCNC: 162 MG/DL (ref 75–99)
GLUCOSE SERPL-MCNC: 149 MG/DL (ref 74–99)
HCT VFR BLD AUTO: 45.7 % (ref 39–53)
HGB BLD-MCNC: 15.4 GM/DL (ref 13–17.5)
LYMPHOCYTES # SPEC AUTO: 1.3 K/UL (ref 1–4.8)
LYMPHOCYTES NFR SPEC AUTO: 24 %
MCH RBC QN AUTO: 31.4 PG (ref 25–35)
MCHC RBC AUTO-ENTMCNC: 33.7 G/DL (ref 31–37)
MCV RBC AUTO: 93.1 FL (ref 80–100)
MONOCYTES # BLD AUTO: 0.4 K/UL (ref 0–1)
MONOCYTES NFR BLD AUTO: 7 %
NEUTROPHILS # BLD AUTO: 3.5 K/UL (ref 1.3–7.7)
NEUTROPHILS NFR BLD AUTO: 65 %
PLATELET # BLD AUTO: 140 K/UL (ref 150–450)
POTASSIUM SERPL-SCNC: 4.2 MMOL/L (ref 3.5–5.1)
SODIUM SERPL-SCNC: 139 MMOL/L (ref 137–145)
WBC # BLD AUTO: 5.4 K/UL (ref 3.8–10.6)

## 2018-05-28 RX ADMIN — SODIUM CHLORIDE, PRESERVATIVE FREE SCH ML: 5 INJECTION INTRAVENOUS at 08:06

## 2018-05-28 RX ADMIN — ASPIRIN 81 MG CHEWABLE TABLET SCH MG: 81 TABLET CHEWABLE at 08:05

## 2018-05-28 RX ADMIN — INSULIN ASPART SCH: 100 INJECTION, SOLUTION INTRAVENOUS; SUBCUTANEOUS at 06:07

## 2018-05-28 RX ADMIN — LISINOPRIL SCH MG: 5 TABLET ORAL at 08:06

## 2018-05-28 RX ADMIN — PANTOPRAZOLE SODIUM SCH MG: 40 TABLET, DELAYED RELEASE ORAL at 06:36

## 2018-05-28 RX ADMIN — METOPROLOL TARTRATE SCH MG: 25 TABLET, FILM COATED ORAL at 08:06

## 2018-05-28 RX ADMIN — CEFAZOLIN SCH: 330 INJECTION, POWDER, FOR SOLUTION INTRAMUSCULAR; INTRAVENOUS at 06:59

## 2018-05-28 RX ADMIN — INSULIN ASPART SCH: 100 INJECTION, SOLUTION INTRAVENOUS; SUBCUTANEOUS at 13:29

## 2018-05-28 RX ADMIN — CLOPIDOGREL BISULFATE SCH MG: 75 TABLET ORAL at 08:05

## 2018-05-28 NOTE — PN
PROGRESS NOTE



Mr. Caballero is a gentleman who came in with a non-ST elevation MI and underwent stenting

of LAD and PDA branch of circumflex.  He is doing remarkably well today.  His right

radial site is clean and dry.  Right groin is good.



Vital signs stable S1-S2 heard normally.  Lungs are clear. Abdomen and lower extremity

exam is unchanged.



I am recommending that this gentleman can be discharged today.  I have given him

written prescriptions and instructed him to be on dual antiplatelet therapy, lisinopril

and beta blocker and I will see him in the office in one week.  He will call the office

and I will see him within 1 week.  Advised not to do any strenuous activity other than

going for a walk every day.





MMODL / IJN: [id number] / Job#: [id number]
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
## 2018-05-30 ENCOUNTER — HOSPITAL ENCOUNTER (INPATIENT)
Dept: HOSPITAL 47 - EC | Age: 68
LOS: 2 days | Discharge: HOME | DRG: 282 | End: 2018-06-01
Attending: INTERNAL MEDICINE | Admitting: INTERNAL MEDICINE
Payer: MEDICARE

## 2018-05-30 VITALS — BODY MASS INDEX: 37.2 KG/M2

## 2018-05-30 DIAGNOSIS — Z79.02: ICD-10-CM

## 2018-05-30 DIAGNOSIS — R00.1: ICD-10-CM

## 2018-05-30 DIAGNOSIS — Z98.1: ICD-10-CM

## 2018-05-30 DIAGNOSIS — E78.5: ICD-10-CM

## 2018-05-30 DIAGNOSIS — E11.9: ICD-10-CM

## 2018-05-30 DIAGNOSIS — Z94.7: ICD-10-CM

## 2018-05-30 DIAGNOSIS — Z87.891: ICD-10-CM

## 2018-05-30 DIAGNOSIS — Z86.73: ICD-10-CM

## 2018-05-30 DIAGNOSIS — I25.10: ICD-10-CM

## 2018-05-30 DIAGNOSIS — Z79.82: ICD-10-CM

## 2018-05-30 DIAGNOSIS — R07.89: Primary | ICD-10-CM

## 2018-05-30 DIAGNOSIS — I21.9: ICD-10-CM

## 2018-05-30 DIAGNOSIS — Z79.84: ICD-10-CM

## 2018-05-30 DIAGNOSIS — I10: ICD-10-CM

## 2018-05-30 DIAGNOSIS — Z79.899: ICD-10-CM

## 2018-05-30 LAB
ALBUMIN SERPL-MCNC: 3.9 G/DL (ref 3.5–5)
ALP SERPL-CCNC: 111 U/L (ref 38–126)
ALT SERPL-CCNC: 41 U/L (ref 21–72)
AMYLASE SERPL-CCNC: 48 U/L (ref 30–110)
ANION GAP SERPL CALC-SCNC: 14 MMOL/L
APTT BLD: 24.5 SEC (ref 22–30)
AST SERPL-CCNC: 26 U/L (ref 17–59)
BASOPHILS # BLD AUTO: 0 K/UL (ref 0–0.2)
BASOPHILS NFR BLD AUTO: 0 %
BUN SERPL-SCNC: 15 MG/DL (ref 9–20)
CALCIUM SPEC-MCNC: 9.2 MG/DL (ref 8.4–10.2)
CHLORIDE SERPL-SCNC: 100 MMOL/L (ref 98–107)
CK SERPL-CCNC: 121 U/L (ref 55–170)
CO2 SERPL-SCNC: 23 MMOL/L (ref 22–30)
EOSINOPHIL # BLD AUTO: 0.1 K/UL (ref 0–0.7)
EOSINOPHIL NFR BLD AUTO: 2 %
ERYTHROCYTE [DISTWIDTH] IN BLOOD BY AUTOMATED COUNT: 5.13 M/UL (ref 4.3–5.9)
ERYTHROCYTE [DISTWIDTH] IN BLOOD: 13.6 % (ref 11.5–15.5)
GLUCOSE SERPL-MCNC: 160 MG/DL (ref 74–99)
HCT VFR BLD AUTO: 46.3 % (ref 39–53)
HGB BLD-MCNC: 16.1 GM/DL (ref 13–17.5)
INR PPP: 1.1 (ref ?–1.2)
LIPASE SERPL-CCNC: 38 U/L (ref 23–300)
LYMPHOCYTES # SPEC AUTO: 2 K/UL (ref 1–4.8)
LYMPHOCYTES NFR SPEC AUTO: 25 %
MAGNESIUM SPEC-SCNC: 1.7 MG/DL (ref 1.6–2.3)
MCH RBC QN AUTO: 31.4 PG (ref 25–35)
MCHC RBC AUTO-ENTMCNC: 34.8 G/DL (ref 31–37)
MCV RBC AUTO: 90.1 FL (ref 80–100)
MONOCYTES # BLD AUTO: 0.6 K/UL (ref 0–1)
MONOCYTES NFR BLD AUTO: 7 %
NEUTROPHILS # BLD AUTO: 4.9 K/UL (ref 1.3–7.7)
NEUTROPHILS NFR BLD AUTO: 63 %
PLATELET # BLD AUTO: 191 K/UL (ref 150–450)
POTASSIUM SERPL-SCNC: 4.2 MMOL/L (ref 3.5–5.1)
PROT SERPL-MCNC: 6.6 G/DL (ref 6.3–8.2)
PT BLD: 11 SEC (ref 9–12)
SODIUM SERPL-SCNC: 137 MMOL/L (ref 137–145)
WBC # BLD AUTO: 7.7 K/UL (ref 3.8–10.6)

## 2018-05-30 PROCEDURE — 71046 X-RAY EXAM CHEST 2 VIEWS: CPT

## 2018-05-30 PROCEDURE — 80061 LIPID PANEL: CPT

## 2018-05-30 PROCEDURE — 36415 COLL VENOUS BLD VENIPUNCTURE: CPT

## 2018-05-30 PROCEDURE — 80048 BASIC METABOLIC PNL TOTAL CA: CPT

## 2018-05-30 PROCEDURE — 94760 N-INVAS EAR/PLS OXIMETRY 1: CPT

## 2018-05-30 PROCEDURE — 85610 PROTHROMBIN TIME: CPT

## 2018-05-30 PROCEDURE — 82150 ASSAY OF AMYLASE: CPT

## 2018-05-30 PROCEDURE — 82550 ASSAY OF CK (CPK): CPT

## 2018-05-30 PROCEDURE — 84484 ASSAY OF TROPONIN QUANT: CPT

## 2018-05-30 PROCEDURE — 96374 THER/PROPH/DIAG INJ IV PUSH: CPT

## 2018-05-30 PROCEDURE — 83880 ASSAY OF NATRIURETIC PEPTIDE: CPT

## 2018-05-30 PROCEDURE — 82553 CREATINE MB FRACTION: CPT

## 2018-05-30 PROCEDURE — 83735 ASSAY OF MAGNESIUM: CPT

## 2018-05-30 PROCEDURE — 83690 ASSAY OF LIPASE: CPT

## 2018-05-30 PROCEDURE — 85730 THROMBOPLASTIN TIME PARTIAL: CPT

## 2018-05-30 PROCEDURE — 99291 CRITICAL CARE FIRST HOUR: CPT

## 2018-05-30 PROCEDURE — 93005 ELECTROCARDIOGRAM TRACING: CPT

## 2018-05-30 PROCEDURE — 80053 COMPREHEN METABOLIC PANEL: CPT

## 2018-05-30 PROCEDURE — 85025 COMPLETE CBC W/AUTO DIFF WBC: CPT

## 2018-05-30 NOTE — XR
EXAMINATION TYPE: XR chest 2V

 

DATE OF EXAM: 5/30/2018

 

COMPARISON: 5/25/2018

 

HISTORY: Chest pain

 

TECHNIQUE:  Frontal and lateral views of the chest are obtained.

 

FINDINGS:  Heart and mediastinum are normal. Lungs are clear. Diaphragm is normal. There are chest le
ads. Bony thorax is intact. There is spurring in the thoracic spine.

 

IMPRESSION:  No active cardiopulmonary disease. Normal heart.

## 2018-05-31 VITALS — RESPIRATION RATE: 18 BRPM

## 2018-05-31 LAB
CK SERPL-CCNC: 94 U/L (ref 55–170)
CK SERPL-CCNC: 98 U/L (ref 55–170)
GLUCOSE BLD-MCNC: 141 MG/DL (ref 75–99)
GLUCOSE BLD-MCNC: 143 MG/DL (ref 75–99)
GLUCOSE BLD-MCNC: 167 MG/DL (ref 75–99)
GLUCOSE BLD-MCNC: 170 MG/DL (ref 75–99)
TROPONIN I SERPL-MCNC: 0.37 NG/ML (ref 0–0.03)
TROPONIN I SERPL-MCNC: 0.74 NG/ML (ref 0–0.03)
TROPONIN I SERPL-MCNC: 1.17 NG/ML (ref 0–0.03)

## 2018-05-31 RX ADMIN — CLOPIDOGREL BISULFATE SCH MG: 75 TABLET ORAL at 08:09

## 2018-05-31 RX ADMIN — METOPROLOL TARTRATE SCH MG: 25 TABLET, FILM COATED ORAL at 23:55

## 2018-05-31 RX ADMIN — INSULIN ASPART SCH UNIT: 100 INJECTION, SOLUTION INTRAVENOUS; SUBCUTANEOUS at 12:52

## 2018-05-31 RX ADMIN — Medication SCH MG: at 08:09

## 2018-05-31 RX ADMIN — INSULIN ASPART SCH UNIT: 100 INJECTION, SOLUTION INTRAVENOUS; SUBCUTANEOUS at 23:55

## 2018-05-31 RX ADMIN — METOPROLOL TARTRATE SCH MG: 25 TABLET, FILM COATED ORAL at 08:08

## 2018-05-31 RX ADMIN — INSULIN ASPART SCH UNIT: 100 INJECTION, SOLUTION INTRAVENOUS; SUBCUTANEOUS at 06:40

## 2018-05-31 RX ADMIN — INSULIN ASPART SCH UNIT: 100 INJECTION, SOLUTION INTRAVENOUS; SUBCUTANEOUS at 17:22
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
## 2018-06-01 VITALS — HEART RATE: 60 BPM | DIASTOLIC BLOOD PRESSURE: 77 MMHG | TEMPERATURE: 96.8 F | SYSTOLIC BLOOD PRESSURE: 141 MMHG

## 2018-06-01 LAB
ANION GAP SERPL CALC-SCNC: 12 MMOL/L
BUN SERPL-SCNC: 13 MG/DL (ref 9–20)
CALCIUM SPEC-MCNC: 9 MG/DL (ref 8.4–10.2)
CHLORIDE SERPL-SCNC: 104 MMOL/L (ref 98–107)
CHOLEST SERPL-MCNC: 128 MG/DL (ref ?–200)
CO2 SERPL-SCNC: 22 MMOL/L (ref 22–30)
GLUCOSE BLD-MCNC: 131 MG/DL (ref 75–99)
GLUCOSE SERPL-MCNC: 147 MG/DL (ref 74–99)
HDLC SERPL-MCNC: 31 MG/DL (ref 40–60)
LDLC SERPL CALC-MCNC: 78 MG/DL (ref 0–99)
POTASSIUM SERPL-SCNC: 4.4 MMOL/L (ref 3.5–5.1)
SODIUM SERPL-SCNC: 138 MMOL/L (ref 137–145)
TRIGL SERPL-MCNC: 97 MG/DL (ref ?–150)

## 2018-06-01 RX ADMIN — METOPROLOL TARTRATE SCH MG: 25 TABLET, FILM COATED ORAL at 07:49

## 2018-06-01 RX ADMIN — CLOPIDOGREL BISULFATE SCH MG: 75 TABLET ORAL at 07:49

## 2018-06-01 RX ADMIN — Medication SCH MG: at 07:49

## 2018-06-01 RX ADMIN — INSULIN ASPART SCH: 100 INJECTION, SOLUTION INTRAVENOUS; SUBCUTANEOUS at 07:40

## 2018-06-01 NOTE — P.DS
Providers


Date of admission: 


05/31/18 00:36





Expected date of discharge: 06/01/18


Attending physician: 


Radha Dang


Consults: 





 





05/31/18 00:36


Consult Physician Urgent 


   Consulting Provider: Momo Branch


   Consult Reason/Comments: Chest pain


   Do you want consulting provider notified?: Yes











Primary care physician: 


Yanna Castrejon





Hospital Course: 


Final Diagnoses:





-Chest pain: Patient appears to have musculoskeletal chest pain, recent cardiac 

catheterization and stenting patient was admitted for monitoring. 


-Coronary artery disease with recent myocardial infarction


-Hypertension 


-Hyperlipidemia


-Type 2 diabetes,









































Hospital Course:


67-year-old  gentleman with history of hypertension, diabetes, 

hyperlipidemia, recent myocardial infarction for which patient underwent 

angioplasty and stenting of the LAD and PDA branch of the circumflex last week.

  Patient was discharged home in stable condition, return to the hospital on 

this occasion with symptoms of right upper chest pain which she states felt 

like an ache in his right upper chest area, lasted approximately an hour in 

duration.  He does feel that this discomfort is different than when he 

presented with his MI last week.  The patient states that he did carry out some 

garbage to the road which he feels may have exasperated the pain.  He does have 

some discomfort in his shoulders this morning, improves when he raises his 

arms.  Appears somewhat musculoskeletal.  His EKG on presentation here showed a 

sinus bradycardia with nonspecific ST-T wave changes in the inferior lateral 

leads, subsequent EKG performed this morning shows normal sinus rhythm with T-

wave inversion in the inferior leads.  Chest x-ray did not reveal any active 

cardiopulmonary disease.  Patient denied and diaphoresis patient denied any 

pleuritic pain.  Patient took 1 nitroglycerin which did not improve his pain.  

Patient troponin is minimally elevated which appears to be that trended down 

troponin from his previous microinfarction.  Evaluated by cardiology.  Dual 

anticoagulation.  Cleared for discharge by cardiology.  Patient is being 

discharged home in a stable condition with guarded prognosis.  Outpatient 

diabetes education classes recommended.

















PHYSICAL EXAMINATION: 





GENERAL: alert and oriented x3, not in any acute distress. 


CARDIOVASCULAR: S1 and S2 present. No murmurs, rubs, or gallops. 


PULMONARY: Chest is clear to auscultation, no wheezing or crackles. 


ABDOMEN: Soft, nontender, nondistended, normoactive bowel sounds. No palpable 

organomegaly


NEUROLOGICAL: Gross neurological examination did not reveal any focal deficits. 














The impression and plan of care has been dictated as directed.





:


I performed a history and examination of this patient,  discussed the same with 

the dictator.  I agree with the dictator's note ,documented as a scribe.  Any 

additional findings or plans will be noted.











Time taken: 35 minutes





Patient Condition at Discharge: Stable





Plan - Discharge Summary


Discharge Rx Participant: No


New Discharge Prescriptions: 


Continue


   Glucosam/Gamal-Msm1/C/Carson/Bosw [Glucosamine-Chondroitin Tablet] 1 tab PO 

DAILY


   Magnesium(Unknown Dose) 1 tab PO DAILY


   Garlic 1 tab PO DAILY


   Cinnamon Bark [Cinnamon] 500 mg PO DAILY


   Prostate Strong 1 tab PO DAILY


   Fenugreek 1 tab PO DAILY


   Aspirin 81 mg PO DAILY  chew


   Atorvastatin [Lipitor] 80 mg PO HS  tab


   Clopidogrel [Plavix] 75 mg PO DAILY  tab


   metFORMIN HCL [Glucophage] 500 mg PO BID #60 tab


   Metoprolol Tartrate [Lopressor] 25 mg PO BID  tab


   Nitroglycerin Sl Tabs [Nitrostat] 0.4 mg SUBLINGUAL Q5M PRN #20 tab


     PRN Reason: Chest Pain


   Lisinopril [Zestril] 10 mg PO HS #90 tab


Discharge Medication List





Cinnamon Bark [Cinnamon] 500 mg PO DAILY 05/25/18 [History]


Fenugreek 1 tab PO DAILY 05/25/18 [History]


Garlic 1 tab PO DAILY 05/25/18 [History]


Glucosam/Gamal-Msm1/C/Carson/Bosw [Glucosamine-Chondroitin Tablet] 1 tab PO DAILY 

05/25/18 [History]


Magnesium(Unknown Dose) 1 tab PO DAILY 05/25/18 [History]


Prostate Strong 1 tab PO DAILY 05/25/18 [History]


Aspirin 81 mg PO DAILY  chew 05/28/18 [Rx]


Atorvastatin [Lipitor] 80 mg PO HS  tab 05/28/18 [Rx]


Clopidogrel [Plavix] 75 mg PO DAILY  tab 05/28/18 [Rx]


Lisinopril [Zestril] 10 mg PO HS #90 tab 05/28/18 [Rx]


Metoprolol Tartrate [Lopressor] 25 mg PO BID  tab 05/28/18 [Rx]


Nitroglycerin Sl Tabs [Nitrostat] 0.4 mg SUBLINGUAL Q5M PRN #20 tab 05/28/18 [Rx

]


metFORMIN HCL [Glucophage] 500 mg PO BID #60 tab 05/28/18 [Rx]








Follow up Appointment(s)/Referral(s): 


Momo Branch MD [STAFF PHYSICIAN] - 06/06/18 2:30 pm (Wednesday)


Yanna Castrejon MD [Primary Care Provider] - 06/06/18 9:45 am (Wednesday)


Ambulatory/Diagnostic Orders: 


Complete Blood Count w/diff [LAB.AMB] Time Frame: 3 Days, Location: Determined 

By Patient


Patient Instructions/Handouts:  Chest Pain (DC), Meal Planning with Diabetes 

Exchanges (DC)


Activity/Diet/Wound Care/Special Instructions: 


Glucometer and testing supplies ordered through [email or web address] - [phone] (

expect delivery to home week of 6/3/18) - sample glucometer and testing 

supplies provided








DIet: consist. Carb


Accu cheks ACHS,Maintain Log, take to F/U with PCP for further rec.








Activity:


limited Till F/U








OP DM education classes [id number]


Discharge Disposition: HOME SELF-CARE

## 2018-06-01 NOTE — PN
PROGRESS NOTE



Mr. Caballero is doing very well.  He has no further chest pain since yesterday.  His pain

in the shoulder seems to be musculoskeletal.  He has history of multivessel PCI.  I am

recommending that he can be discharged on current medications and I reviewed with him

the importance of taking dual antiplatelet therapy without interruption.



Vital signs are stable.  S1-S2 heard normally.  Lungs are clear.  Abdomen and lower

extremity exam is unchanged.



Plan is to discharge him and I will see him in the office in one week.





MMODL / IJN: [id number] / Job#: [id number]

## 2019-08-25 ENCOUNTER — HOSPITAL ENCOUNTER (INPATIENT)
Dept: HOSPITAL 47 - EC | Age: 69
LOS: 3 days | Discharge: HOME | DRG: 287 | End: 2019-08-28
Attending: INTERNAL MEDICINE | Admitting: INTERNAL MEDICINE
Payer: MEDICARE

## 2019-08-25 DIAGNOSIS — M19.90: ICD-10-CM

## 2019-08-25 DIAGNOSIS — Z79.84: ICD-10-CM

## 2019-08-25 DIAGNOSIS — E78.5: ICD-10-CM

## 2019-08-25 DIAGNOSIS — I25.2: ICD-10-CM

## 2019-08-25 DIAGNOSIS — Z94.7: ICD-10-CM

## 2019-08-25 DIAGNOSIS — E78.00: ICD-10-CM

## 2019-08-25 DIAGNOSIS — Z87.442: ICD-10-CM

## 2019-08-25 DIAGNOSIS — E66.9: ICD-10-CM

## 2019-08-25 DIAGNOSIS — Z79.899: ICD-10-CM

## 2019-08-25 DIAGNOSIS — I25.110: Primary | ICD-10-CM

## 2019-08-25 DIAGNOSIS — Z95.5: ICD-10-CM

## 2019-08-25 DIAGNOSIS — I10: ICD-10-CM

## 2019-08-25 DIAGNOSIS — E11.9: ICD-10-CM

## 2019-08-25 DIAGNOSIS — Z98.1: ICD-10-CM

## 2019-08-25 DIAGNOSIS — Z86.73: ICD-10-CM

## 2019-08-25 DIAGNOSIS — Z87.891: ICD-10-CM

## 2019-08-25 DIAGNOSIS — Z79.82: ICD-10-CM

## 2019-08-25 DIAGNOSIS — Z79.02: ICD-10-CM

## 2019-08-25 LAB
ALBUMIN SERPL-MCNC: 3.8 G/DL (ref 3.5–5)
ALP SERPL-CCNC: 101 U/L (ref 38–126)
ALT SERPL-CCNC: 42 U/L (ref 21–72)
ANION GAP SERPL CALC-SCNC: 8 MMOL/L
APTT BLD: 24.5 SEC (ref 22–30)
AST SERPL-CCNC: 24 U/L (ref 17–59)
BASOPHILS # BLD AUTO: 0 K/UL (ref 0–0.2)
BASOPHILS NFR BLD AUTO: 1 %
BUN SERPL-SCNC: 11 MG/DL (ref 9–20)
CALCIUM SPEC-MCNC: 9 MG/DL (ref 8.4–10.2)
CHLORIDE SERPL-SCNC: 103 MMOL/L (ref 98–107)
CO2 SERPL-SCNC: 26 MMOL/L (ref 22–30)
EOSINOPHIL # BLD AUTO: 0.1 K/UL (ref 0–0.7)
EOSINOPHIL NFR BLD AUTO: 1 %
ERYTHROCYTE [DISTWIDTH] IN BLOOD BY AUTOMATED COUNT: 5.19 M/UL (ref 4.3–5.9)
ERYTHROCYTE [DISTWIDTH] IN BLOOD: 13.4 % (ref 11.5–15.5)
GLUCOSE BLD-MCNC: 180 MG/DL (ref 75–99)
GLUCOSE BLD-MCNC: 185 MG/DL (ref 75–99)
GLUCOSE SERPL-MCNC: 222 MG/DL (ref 74–99)
HCT VFR BLD AUTO: 47.9 % (ref 39–53)
HGB BLD-MCNC: 16.2 GM/DL (ref 13–17.5)
INR PPP: 1 (ref ?–1.2)
LYMPHOCYTES # SPEC AUTO: 1.2 K/UL (ref 1–4.8)
LYMPHOCYTES NFR SPEC AUTO: 24 %
MAGNESIUM SPEC-SCNC: 1.7 MG/DL (ref 1.6–2.3)
MCH RBC QN AUTO: 31.3 PG (ref 25–35)
MCHC RBC AUTO-ENTMCNC: 33.9 G/DL (ref 31–37)
MCV RBC AUTO: 92.2 FL (ref 80–100)
MONOCYTES # BLD AUTO: 0.4 K/UL (ref 0–1)
MONOCYTES NFR BLD AUTO: 7 %
NEUTROPHILS # BLD AUTO: 3.1 K/UL (ref 1.3–7.7)
NEUTROPHILS NFR BLD AUTO: 64 %
PLATELET # BLD AUTO: 165 K/UL (ref 150–450)
POTASSIUM SERPL-SCNC: 4.5 MMOL/L (ref 3.5–5.1)
PROT SERPL-MCNC: 6.9 G/DL (ref 6.3–8.2)
PT BLD: 10.6 SEC (ref 9–12)
SODIUM SERPL-SCNC: 137 MMOL/L (ref 137–145)
WBC # BLD AUTO: 4.8 K/UL (ref 3.8–10.6)

## 2019-08-25 PROCEDURE — 99291 CRITICAL CARE FIRST HOUR: CPT

## 2019-08-25 PROCEDURE — 80053 COMPREHEN METABOLIC PANEL: CPT

## 2019-08-25 PROCEDURE — 71046 X-RAY EXAM CHEST 2 VIEWS: CPT

## 2019-08-25 PROCEDURE — 36415 COLL VENOUS BLD VENIPUNCTURE: CPT

## 2019-08-25 PROCEDURE — 93005 ELECTROCARDIOGRAM TRACING: CPT

## 2019-08-25 PROCEDURE — 96366 THER/PROPH/DIAG IV INF ADDON: CPT

## 2019-08-25 PROCEDURE — 96376 TX/PRO/DX INJ SAME DRUG ADON: CPT

## 2019-08-25 PROCEDURE — 80061 LIPID PANEL: CPT

## 2019-08-25 PROCEDURE — 85610 PROTHROMBIN TIME: CPT

## 2019-08-25 PROCEDURE — 83735 ASSAY OF MAGNESIUM: CPT

## 2019-08-25 PROCEDURE — 85025 COMPLETE CBC W/AUTO DIFF WBC: CPT

## 2019-08-25 PROCEDURE — 85730 THROMBOPLASTIN TIME PARTIAL: CPT

## 2019-08-25 PROCEDURE — 96365 THER/PROPH/DIAG IV INF INIT: CPT

## 2019-08-25 PROCEDURE — 93458 L HRT ARTERY/VENTRICLE ANGIO: CPT

## 2019-08-25 PROCEDURE — 83880 ASSAY OF NATRIURETIC PEPTIDE: CPT

## 2019-08-25 PROCEDURE — 84484 ASSAY OF TROPONIN QUANT: CPT

## 2019-08-25 RX ADMIN — LISINOPRIL SCH MG: 5 TABLET ORAL at 23:52

## 2019-08-25 RX ADMIN — METOPROLOL TARTRATE SCH MG: 25 TABLET, FILM COATED ORAL at 23:52

## 2019-08-25 RX ADMIN — HEPARIN SODIUM SCH MLS/HR: 10000 INJECTION, SOLUTION INTRAVENOUS at 12:11

## 2019-08-25 RX ADMIN — ATORVASTATIN CALCIUM SCH MG: 80 TABLET, FILM COATED ORAL at 23:52

## 2019-08-25 RX ADMIN — ACETAMINOPHEN SCH ML: 160 SOLUTION ORAL at 18:04

## 2019-08-25 RX ADMIN — NITROGLYCERIN SCH: 20 OINTMENT TOPICAL at 18:06

## 2019-08-25 RX ADMIN — NITROGLYCERIN SCH: 20 OINTMENT TOPICAL at 17:05

## 2019-08-25 RX ADMIN — ACETAMINOPHEN SCH ML: 160 SOLUTION ORAL at 21:23
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
## 2019-08-25 NOTE — XR
EXAMINATION TYPE: XR chest 2V

 

DATE OF EXAM: 8/25/2019

 

COMPARISON: Chest x-ray May 30, 2018.

 

HISTORY: Chest pain.

 

TECHNIQUE:  Frontal and lateral views of the chest are obtained.

 

FINDINGS:  There is chronic parenchymal change without suspicious air space opacity, pleural effusion
, or pneumothorax seen.  The cardiac silhouette size is stable and within normal limits. Surgical meghan
nges cervical spine is redemonstrated.

 

IMPRESSION:  Chronic changes without acute pulmonary process.

## 2019-08-26 LAB
CHOLEST SERPL-MCNC: 121 MG/DL (ref ?–200)
GLUCOSE BLD-MCNC: 170 MG/DL (ref 75–99)
GLUCOSE BLD-MCNC: 170 MG/DL (ref 75–99)
GLUCOSE BLD-MCNC: 181 MG/DL (ref 75–99)
GLUCOSE BLD-MCNC: 232 MG/DL (ref 75–99)
HDLC SERPL-MCNC: 44 MG/DL (ref 40–60)
LDLC SERPL CALC-MCNC: 51 MG/DL (ref 0–99)
TRIGL SERPL-MCNC: 131 MG/DL (ref ?–150)

## 2019-08-26 RX ADMIN — NITROGLYCERIN SCH: 20 OINTMENT TOPICAL at 01:02

## 2019-08-26 RX ADMIN — ASPIRIN SCH: 325 TABLET ORAL at 12:51

## 2019-08-26 RX ADMIN — INSULIN ASPART SCH UNIT: 100 INJECTION, SOLUTION INTRAVENOUS; SUBCUTANEOUS at 17:15

## 2019-08-26 RX ADMIN — ACETAMINOPHEN SCH ML: 160 SOLUTION ORAL at 21:22

## 2019-08-26 RX ADMIN — ASPIRIN 325 MG ORAL TABLET SCH MG: 325 PILL ORAL at 11:06

## 2019-08-26 RX ADMIN — ATORVASTATIN CALCIUM SCH MG: 80 TABLET, FILM COATED ORAL at 21:22

## 2019-08-26 RX ADMIN — ACETAMINOPHEN SCH: 160 SOLUTION ORAL at 11:06

## 2019-08-26 RX ADMIN — INSULIN ASPART SCH UNIT: 100 INJECTION, SOLUTION INTRAVENOUS; SUBCUTANEOUS at 21:22

## 2019-08-26 RX ADMIN — METOPROLOL TARTRATE SCH MG: 50 TABLET, FILM COATED ORAL at 11:06

## 2019-08-26 RX ADMIN — ACETAMINOPHEN SCH: 160 SOLUTION ORAL at 17:16

## 2019-08-26 RX ADMIN — METOPROLOL TARTRATE SCH MG: 25 TABLET, FILM COATED ORAL at 21:22

## 2019-08-26 RX ADMIN — LISINOPRIL SCH MG: 5 TABLET ORAL at 21:22

## 2019-08-26 RX ADMIN — NITROGLYCERIN SCH: 20 OINTMENT TOPICAL at 17:16

## 2019-08-26 RX ADMIN — CLOPIDOGREL BISULFATE SCH MG: 75 TABLET ORAL at 11:06

## 2019-08-26 RX ADMIN — NITROGLYCERIN SCH: 20 OINTMENT TOPICAL at 13:34

## 2019-08-26 RX ADMIN — ACETAMINOPHEN SCH: 160 SOLUTION ORAL at 13:34

## 2019-08-26 RX ADMIN — NITROGLYCERIN SCH: 20 OINTMENT TOPICAL at 06:11

## 2019-08-26 RX ADMIN — HEPARIN SODIUM SCH MLS/HR: 10000 INJECTION, SOLUTION INTRAVENOUS at 14:11
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
## 2019-08-26 NOTE — P.PN
Subjective


Progress Note Date: 08/26/19


Principal diagnosis: 





Chest pain





Patient is a 68-year-old male with a known history of hypertension, diabetes 

type 2, history of coronary disease and stent placement 3 came to ER with 

complaints of chest pain.  Patient says that today morning patient developed 

left sided chest pain and radiated to the side of the chest.  Denied any 

radiation to the left arm.  Pain he is assisted with minimal shortness of 

breath.  Denied any nausea vomiting or diaphoresis.  Patient did have headache 

all day.  Pain has been constant since then with improved severe 80 currently.  

Patient is still having dull left-sided chest pain.  Patient's wife suggested 

him go to ER for further evaluation due to prior history of coronary artery 

disease.


Denied any orthopnea or PND.  Denied any recent illnesses.  No leg swelling.  No

nausea vomiting diarrhea or abdominal pain.





Chest x-ray showed chronic changes without any acute process


EKG showed normal sinus rhythm.


Troponin 2 negative








08/26/2019


Patient denied any complaints of chest pain or pressure today.  Serial EKG and 

troponin 3 negative.  Cardiology is planning for catheterization tomorrow.  

Patient does have a history of coronary artery disease and stent placement 3.


No nausea vomiting.  No headache or dizziness or lightheadedness.





Current medications reviewed.





Objective





- Vital Signs


Vital signs: 


                                   Vital Signs











Temp  98 F   08/26/19 20:00


 


Pulse  63   08/26/19 20:00


 


Resp  18   08/26/19 20:00


 


BP  158/83   08/26/19 20:00


 


Pulse Ox  96   08/26/19 20:00








                                 Intake & Output











 08/26/19 08/26/19 08/27/19





 06:59 18:59 06:59


 


Intake Total 0 1150 


 


Balance 0 1150 


 


Intake:   


 


  Intake, IV Titration  250 





  Amount   


 


    Heparin Sod,Pork in 0.45%  250 





    NaCl 25,000 unit In 0.45   





    % NaCl 1 250ml.bag @ 9.   





    842 UNITS/KG/HR 10 mls/hr   





    IV .Q24H GARY Rx#:   





    [id number]   


 


  Oral 0 900 


 


Other:   


 


  Voiding Method Toilet Toilet 


 


  # Voids 1  














- Exam





PHYSICAL EXAMINATION: 


Patient is lying in the bed comfortably, no acute distress, awake alert and 

oriented.. 


HEENT: Normocephalic. Neck is supple. Pupils reactive. Nostrils clear. Oral 

cavity is moist. Ears reveal no drainage. 


Neck reveals no JVD, carotid bruits, or thyromegaly. 


CHEST EXAMINATION: Trachea is central. Symmetrical expansion. Lung fields clear 

to auscultation and percussion. 


CARDIAC: Normal S1, S2 with no gallops. No murmurs 


ABDOMEN: Soft. Bowel sounds normal. No organomegaly. No abdominal bruits. 


Extremities: reveal no edema.  No clubbing or cyanosis


Neurologically awake, alert, oriented x3 with well-coordinated movements.  No 

focal deficits noted


Skin: No rash or skin lesions. 


Psychiatric: Coperative.  Nonsuicidal


Musculoskeletal: No joint swelling or deformity.  Normal range of motion.








- Labs


CBC & Chem 7: 


                                 08/25/19 10:30





                                 08/25/19 10:30


Labs: 


                  Abnormal Lab Results - Last 24 Hours (Table)











  08/26/19 08/26/19 08/26/19 Range/Units





  06:41 11:37 13:12 


 


APTT    35.6 H  (22.0-30.0)  sec


 


POC Glucose (mg/dL)  181 H  170 H   (75-99)  mg/dL














  08/26/19 08/26/19 08/26/19 Range/Units





  16:48 20:28 20:38 


 


APTT   54.4 H   (22.0-30.0)  sec


 


POC Glucose (mg/dL)  232 H   170 H  (75-99)  mg/dL














Assessment and Plan


Assessment: 





Atypical chest pain.  Possible unstable angina.  Ruled out ACS.


Coronary artery disease with history of stent placement 3


Diabetes type 2


Hypertension


Previous history of smoking


Osteoarthritis


History of nephrolithiasis





Plan:


Patient be continued on telemetry monitoring.  Serial EKG and troponin 3.  

Patient was started on heparin drip.  Continue with aspirin statins and beta 

blockers.  Continue with home blood pressure medications and insulin sliding 

scale.  Cardiology is planning for cardiac cath tomorrow..


Further recommendations based on the clinical course.


Time with Patient: Greater than 30

## 2019-08-27 VITALS — RESPIRATION RATE: 18 BRPM

## 2019-08-27 LAB
GLUCOSE BLD-MCNC: 136 MG/DL (ref 75–99)
GLUCOSE BLD-MCNC: 150 MG/DL (ref 75–99)
GLUCOSE BLD-MCNC: 181 MG/DL (ref 75–99)
GLUCOSE BLD-MCNC: 213 MG/DL (ref 75–99)

## 2019-08-27 PROCEDURE — B2111ZZ FLUOROSCOPY OF MULTIPLE CORONARY ARTERIES USING LOW OSMOLAR CONTRAST: ICD-10-PCS

## 2019-08-27 PROCEDURE — 4A023N7 MEASUREMENT OF CARDIAC SAMPLING AND PRESSURE, LEFT HEART, PERCUTANEOUS APPROACH: ICD-10-PCS

## 2019-08-27 RX ADMIN — ACETAMINOPHEN SCH: 160 SOLUTION ORAL at 12:03

## 2019-08-27 RX ADMIN — ACETAMINOPHEN SCH: 160 SOLUTION ORAL at 08:28

## 2019-08-27 RX ADMIN — LISINOPRIL SCH MG: 5 TABLET ORAL at 21:28

## 2019-08-27 RX ADMIN — HEPARIN SODIUM SCH MLS/HR: 10000 INJECTION, SOLUTION INTRAVENOUS at 13:03

## 2019-08-27 RX ADMIN — ASPIRIN 325 MG ORAL TABLET SCH MG: 325 PILL ORAL at 08:26

## 2019-08-27 RX ADMIN — INSULIN ASPART SCH UNIT: 100 INJECTION, SOLUTION INTRAVENOUS; SUBCUTANEOUS at 08:35

## 2019-08-27 RX ADMIN — METOPROLOL TARTRATE SCH MG: 50 TABLET, FILM COATED ORAL at 08:26

## 2019-08-27 RX ADMIN — INSULIN ASPART SCH: 100 INJECTION, SOLUTION INTRAVENOUS; SUBCUTANEOUS at 12:03

## 2019-08-27 RX ADMIN — METOPROLOL TARTRATE SCH MG: 25 TABLET, FILM COATED ORAL at 21:20

## 2019-08-27 RX ADMIN — NITROGLYCERIN SCH: 20 OINTMENT TOPICAL at 08:26

## 2019-08-27 RX ADMIN — ACETAMINOPHEN SCH ML: 160 SOLUTION ORAL at 21:29

## 2019-08-27 RX ADMIN — NITROGLYCERIN SCH: 20 OINTMENT TOPICAL at 12:03

## 2019-08-27 RX ADMIN — NITROGLYCERIN SCH: 20 OINTMENT TOPICAL at 17:18

## 2019-08-27 RX ADMIN — ASPIRIN SCH: 325 TABLET ORAL at 08:28

## 2019-08-27 RX ADMIN — ACETAMINOPHEN SCH ML: 160 SOLUTION ORAL at 21:28

## 2019-08-27 RX ADMIN — CLOPIDOGREL BISULFATE SCH MG: 75 TABLET ORAL at 08:26

## 2019-08-27 RX ADMIN — ATORVASTATIN CALCIUM SCH MG: 80 TABLET, FILM COATED ORAL at 21:28

## 2019-08-27 RX ADMIN — INSULIN ASPART SCH: 100 INJECTION, SOLUTION INTRAVENOUS; SUBCUTANEOUS at 17:18

## 2019-08-27 RX ADMIN — INSULIN ASPART SCH UNIT: 100 INJECTION, SOLUTION INTRAVENOUS; SUBCUTANEOUS at 21:29

## 2019-08-27 RX ADMIN — NITROGLYCERIN SCH: 20 OINTMENT TOPICAL at 08:27

## 2019-08-27 RX ADMIN — ACETAMINOPHEN SCH: 160 SOLUTION ORAL at 17:18

## 2019-08-27 NOTE — CC
CARDIAC CATHETERIZATION REPORT



DATE OF SERVICE:

08/27/2019.



PROCEDURE:

Left heart catheterization and coronary angiography.



PERFORMED BY:

Dr. HALEY Branch.



SEDATION:

Moderate conscious sedation time was 20 minutes.  Patient was administered Versed.  His

oxygen saturation, hemodynamics and EKG were monitored closely.



CLINICAL INFORMATION:

Mr. Daniel Caballero is a gentleman with history of hypertension, diabetes,

hyperlipidemia who also has significant CAD, underwent stenting of a 95% proximal LAD

performed on May 26, 2018.  On the next day on the 27th, I performed stenting of PDA

branch of circumflex.  He has a superdominant circumflex vessel and also has a

nondominant fair caliber RCA.  He presented to the hospital with symptoms of chest pain

suggestive of angina without elevation of enzymes.  Given the fact, he has multivessel

intervention in the past, he was advised cardiac cath after due discussion regarding

the rationale, risks, benefits, and options.



PROCEDURE NOTE:

Under local anesthesia and strict aseptic precautions, a 6-Iraqi introducer was placed

in the right radial artery.  Using a JL4 and JR4 catheters, I performed selective

coronary angiography and the same JR4 catheter was used to check LV pressures.  LV gram

was not performed.  The catheter and sheath were taken out and TR band applied as per

protocol and he was sent to the room in stable condition.  Saturation in the fingers of

the right hand was 97%.  Results were discussed with the patient and family.  He has no

significant disease involving the stented areas.



CORONARY ANGIOGRAPHY FINDINGS:

The left ventricle end-diastolic pressure was 8 mmHg without any gradient across aortic

valve.





RIGHT CORONARY ARTERY:  Technically a nondominant yet good caliber, good distribution

vessel that supplies a fair amount of myocardium, has minor irregularities and distally

bifurcates into 2 small branches and there is a large acute marginal branch prior to

bifurcation.  The entire RCA has minor irregularities.  No significant disease and is a

technically nondominant vessel.



LEFT MAIN CORONARY ARTERY: Short, patent vessel which bifurcates into LAD and

circumflex.  Left main itself is free of significant disease.



LEFT ANTERIOR DESCENDING CORONARY ARTERY: Good caliber vessel extends along the

anterior wall.  The LAD gives off what seems to be a good size diagonal branch

proximally and then at the stented area is widely patent.  Beyond that, the caliber of

the vessel improves. It runs all the way to the apex.  The distal one fourth  of the

LAD is diffusely diseased.  The stented segment of the proximal/mid LAD is widely

patent without any compromise in angiographic appearance or flow.  The diagonal branch

has a 30-40 percent lesion and comes off before the stented segment, has minor

irregularities.  No significant disease and is a good caliber and good distribution

vessel.



LEFT POSTERIOR CIRCUMFLEX CORONARY ARTERY: Technically a super dominant vessel, gives

off a first obtuse marginal and then gives off a second obtuse marginal that bifurcates

into 2 branches.  The inferior branch of second obtuse marginal has about a 50%

narrowing.  Then the circumflex continues distally and gives off PDA and PLV branches.

The PDA was stented before.  Now appears to be widely patent in the stented segment.

Remarkably good angiographic appearance and flow.  The circumflex system is therefore

superdominant. Second obtuse marginal has a branch which has a 50% narrowing.  The PDA

branch of circumflex is widely patent with remarkably good angiographic appearance and

flow without any evident complication.



LEFT VENTRICULOGRAM: This was not performed.



FINAL IMPRESSION:

This patient has a left dominant system.  The previously stented PDA branch of

circumflex and proximal/mid LAD are widely patent with good flow.  Distal LAD has

diffuse disease unchanged.  The RCA is free of significant disease and filling

pressures are normal without any gradient across the aortic valve.



RECOMMENDATIONS:

Findings were discussed with the patient and wife.  He is advised to continue current

medical therapy with risk factor modification.  No intervention is necessary.  He has

no significant progression of disease and stented segments are patent.  He will be

discharged tomorrow sense it is late today following the cardiac cath.





MMPAMELA / EZION: [id number] / Job#: [id number]

## 2019-08-27 NOTE — P.PN
Subjective


Progress Note Date: 08/27/19


Principal diagnosis: 





Chest pain





Patient is a 68-year-old male with a known history of hypertension, diabetes 

type 2, history of coronary disease and stent placement 3 came to ER with 

complaints of chest pain.  Patient says that today morning patient developed 

left sided chest pain and radiated to the side of the chest.  Denied any 

radiation to the left arm.  Pain he is assisted with minimal shortness of 

breath.  Denied any nausea vomiting or diaphoresis.  Patient did have headache 

all day.  Pain has been constant since then with improved severe 80 currently.  

Patient is still having dull left-sided chest pain.  Patient's wife suggested 

him go to ER for further evaluation due to prior history of coronary artery 

disease.


Denied any orthopnea or PND.  Denied any recent illnesses.  No leg swelling.  No

nausea vomiting diarrhea or abdominal pain.





Chest x-ray showed chronic changes without any acute process


EKG showed normal sinus rhythm.


Troponin 2 negative








08/26/2019


Patient denied any complaints of chest pain or pressure today.  Serial EKG and 

troponin 3 negative.  Cardiology is planning for catheterization tomorrow.  

Patient does have a history of coronary artery disease and stent placement 3.


No nausea vomiting.  No headache or dizziness or lightheadedness.





08/27/2019


Patient says that he still having chest discomfort left retrosternal.  Scheduled

for cardiac catheter patient today afternoon.  Otherwise no complaint of 

shortness of breath.  No arrhythmias noted.  No headache or dizziness or 

lightheadedness.  No cough or sputum production.





Current medications reviewed.





Objective





- Vital Signs


Vital signs: 


                                   Vital Signs











Temp  98.0 F   08/27/19 16:30


 


Pulse  62   08/27/19 16:30


 


Resp  16   08/27/19 16:30


 


BP  141/79   08/27/19 19:03


 


Pulse Ox  95   08/27/19 16:30








                                 Intake & Output











 08/27/19 08/27/19 08/28/19





 06:59 18:59 06:59


 


Intake Total 20 350 


 


Balance 20 350 


 


Intake:   


 


  IV  100 


 


  Intake, IV Titration  250 





  Amount   


 


    Heparin Sod,Pork in 0.45%  250 





    NaCl 25,000 unit In 0.45   





    % NaCl 1 250ml.bag @ 9.   





    842 UNITS/KG/HR 10 mls/hr   





    IV .Q24H [facility] Rx#:   





    [id number]   


 


  Oral 20  


 


Other:   


 


  Voiding Method Toilet Toilet 


 


  # Voids  1 














- Exam





PHYSICAL EXAMINATION: 


Patient is lying in the bed comfortably, no acute distress, awake alert and 

oriented.. 


HEENT: Normocephalic. Neck is supple. Pupils reactive. Nostrils clear. Oral 

cavity is moist. Ears reveal no drainage. 


Neck reveals no JVD, carotid bruits, or thyromegaly. 


CHEST EXAMINATION: Trachea is central. Symmetrical expansion. Lung fields clear 

to auscultation and percussion. 


CARDIAC: Normal S1, S2 with no gallops. No murmurs 


ABDOMEN: Soft. Bowel sounds normal. No organomegaly. No abdominal bruits. 


Extremities: reveal no edema.  No clubbing or cyanosis


Neurologically awake, alert, oriented x3 with well-coordinated movements.  No 

focal deficits noted


Skin: No rash or skin lesions. 


Psychiatric: Coperative.  Nonsuicidal


Musculoskeletal: No joint swelling or deformity.  Normal range of motion.








- Labs


CBC & Chem 7: 


                                 08/25/19 10:30





                                 08/25/19 10:30


Labs: 


                  Abnormal Lab Results - Last 24 Hours (Table)











  08/27/19 08/27/19 08/27/19 Range/Units





  06:23 06:51 11:53 


 


APTT  43.1 H    (22.0-30.0)  sec


 


POC Glucose (mg/dL)   181 H  150 H  (75-99)  mg/dL














  08/27/19 08/27/19 Range/Units





  17:02 20:43 


 


APTT    (22.0-30.0)  sec


 


POC Glucose (mg/dL)  136 H  213 H  (75-99)  mg/dL














Assessment and Plan


Assessment: 





Atypical chest pain.  Possible unstable angina.  Ruled out ACS.  Cardiac ca

theterization today afternoon.


Coronary artery disease with history of stent placement 3


Diabetes type 2


Hypertension


Previous history of smoking


Osteoarthritis


History of nephrolithiasis





Plan:


Patient be continued on telemetry monitoring.  Serial EKG and troponin 3.  

Patient was started on heparin drip.  Continue with aspirin statins and beta 

blockers.  Continue with home blood pressure medications and insulin sliding 

scale.  Cardiology is planning for cardiac cath today..


Further recommendations based on the clinical course.


Time with Patient: Greater than 30

## 2019-08-28 VITALS — TEMPERATURE: 97.7 F | SYSTOLIC BLOOD PRESSURE: 167 MMHG | HEART RATE: 67 BPM | DIASTOLIC BLOOD PRESSURE: 72 MMHG

## 2019-08-28 LAB
GLUCOSE BLD-MCNC: 148 MG/DL (ref 75–99)
GLUCOSE BLD-MCNC: 172 MG/DL (ref 75–99)

## 2019-08-28 RX ADMIN — NITROGLYCERIN SCH: 20 OINTMENT TOPICAL at 07:59

## 2019-08-28 RX ADMIN — ASPIRIN 325 MG ORAL TABLET SCH MG: 325 PILL ORAL at 08:00

## 2019-08-28 RX ADMIN — INSULIN ASPART SCH UNIT: 100 INJECTION, SOLUTION INTRAVENOUS; SUBCUTANEOUS at 08:00

## 2019-08-28 RX ADMIN — NITROGLYCERIN SCH: 20 OINTMENT TOPICAL at 13:28

## 2019-08-28 RX ADMIN — ASPIRIN SCH: 325 TABLET ORAL at 13:28

## 2019-08-28 RX ADMIN — METOPROLOL TARTRATE SCH MG: 50 TABLET, FILM COATED ORAL at 08:00

## 2019-08-28 RX ADMIN — ACETAMINOPHEN SCH: 160 SOLUTION ORAL at 13:28

## 2019-08-28 RX ADMIN — INSULIN ASPART SCH: 100 INJECTION, SOLUTION INTRAVENOUS; SUBCUTANEOUS at 13:28

## 2019-08-28 RX ADMIN — CLOPIDOGREL BISULFATE SCH MG: 75 TABLET ORAL at 08:00

## 2019-08-28 NOTE — P.PN
Subjective





Patient is seen and examined sitting up in bed in no acute distress.  He denies 

any symptoms of chest discomfort, shortness of breath, dizziness or 

palpitations.  Right radial access site is examined.  He has no evidence of 

pain, numbness or tingling to the right hand or arm.  He underwent cardiac 

catheterization yesterday revealing patent stents with no evidence of 

progression of disease.  Blood pressure 167/72 heart rate 67 afebrile maintaini

ng oxygen saturation on room air.  Currently maintained on aspirin 325 mg daily,

atorvastatin 80 mg daily, Plavix 75 mg daily, lisinopril 5 mg daily and 

Lopressor 25 mg at bedtime and 50 mg in the morning.





GENERAL: Well-appearing, well-nourished and in no acute distress.


NECK: Supple without JVD or thyromegaly.


LUNGS: Breath sounds clear to auscultation bilaterally. Respiration equal and 

unlabored.  No wheezes, rales or rhonchi.


HEART: Regular rate and rhythm without murmurs, rubs or gallops. S1 and S2 

heard.


EXTREMITIES: Normal range of motion, no edema.  No clubbing or cyanosis. 

Peripheral pulses intact.  Right radial access site soft, dry with no evidence 

of pain, hematoma or ecchymosis.





ASSESSMENT


Precordial chest pain suggestive of unstable angina.  Cardiac catheterization 

revealed no evidence of progression of coronary artery disease or in-stent 

restenosis.


History of coronary artery disease status post stent placement in the setting of

a myocardial infarction


Hypertension


Dyslipidemia


Diabetes mellitus


TIA


Obesity, BMI 34





PLAN


Stable for discharge from a cardiac perspective.  Follow-up appointment in the 

office with Dr. Branch has been made.





Nurse Practitioner note has been reviewed, I agree with a documented findings 

and plan of care.  Patient was seen and examined.





Objective





- Vital Signs


Vital signs: 


                                   Vital Signs











Temp  97.7 F   08/28/19 08:00


 


Pulse  67   08/28/19 08:00


 


Resp  18   08/28/19 08:00


 


BP  167/72   08/28/19 08:00


 


Pulse Ox  97   08/28/19 08:00








                                 Intake & Output











 08/27/19 08/28/19 08/28/19





 18:59 06:59 18:59


 


Intake Total 350 200 


 


Balance 350 200 


 


Intake:   


 


    


 


  Intake, IV Titration 250  





  Amount   


 


    Heparin Sod,Pork in 0.45% 250  





    NaCl 25,000 unit In 0.45   





    % NaCl 1 250ml.bag @ 9.   





    842 UNITS/KG/HR 10 mls/hr   





    IV .Q24H [facility] Rx#:   





    [id number]   


 


  Oral  200 


 


  Blood Product  0 


 


Other:   


 


  Voiding Method Toilet Toilet Toilet


 


  # Voids 1 1 














- Labs


CBC & Chem 7: 


                                 08/25/19 10:30





                                 08/25/19 10:30


Labs: 


                  Abnormal Lab Results - Last 24 Hours (Table)











  08/27/19 08/27/19 08/28/19 Range/Units





  17:02 20:43 06:35 


 


POC Glucose (mg/dL)  136 H  213 H  172 H  (75-99)  mg/dL














  08/28/19 Range/Units





  11:52 


 


POC Glucose (mg/dL)  148 H  (75-99)  mg/dL

## 2022-04-14 ENCOUNTER — HOSPITAL ENCOUNTER (OUTPATIENT)
Dept: HOSPITAL 47 - RADXRYALE | Age: 72
Discharge: HOME | End: 2022-04-14
Attending: INTERNAL MEDICINE
Payer: MEDICARE

## 2022-04-14 DIAGNOSIS — M12.812: Primary | ICD-10-CM
